# Patient Record
Sex: FEMALE | URBAN - METROPOLITAN AREA
[De-identification: names, ages, dates, MRNs, and addresses within clinical notes are randomized per-mention and may not be internally consistent; named-entity substitution may affect disease eponyms.]

---

## 2017-08-08 NOTE — PATIENT DISCUSSION
Continue: Systane Ultra (PF) (peg 400-propylene glycol (pf)): dropperette: 0.4-0.3% twice a day as needed

## 2017-08-08 NOTE — PATIENT DISCUSSION
PINGUECULA, OU:  PRESCRIBED ARTIFICIAL TEARS BID - QID, OU ADD LOTEMAX PRAMOD QHS OUT X 2 WEEKS.   RETURN TO FOLLOW UP.

## 2017-08-08 NOTE — PATIENT DISCUSSION
MODERATE DRY EYES: PRESCRIBED DISAPPEARING PRESERVATIVE OR PRESERVATIVE FREE ARTIFICIAL TEARS BID &ndash; QID4-6X A DAY, OU AND THE DAILY INTAKE OF OMEGA-3 DHA/EPA FATTY ACIDS TO HELP RELIEVE SYMPTOMS. CONTINUE RESTASIS BID OU.

## 2017-08-08 NOTE — PATIENT DISCUSSION
Posterior Capsular Fibrosis Counseling: The diagnosis of posterior capsular fibrosis (PCF), also referred to as a secondary cataract or posterior capsular opacification (PCO), was discussed with the patient. The patient understands that their symptoms and limitations are likely related to this condition. I have reviewed the risks, benefits and alternatives of  YAG laser surgery for the treatment of the fibrosis. The uncommon risk of an increase in intraocular pressure or a retinal detachment and their associated symptoms were explained to the patient.

## 2017-08-08 NOTE — PATIENT DISCUSSION
Pinguecula Counseling:  I have explained to the patient at length the diagnosis of pinguecula and its pathophysiology. I recommended the patient adequately protect their eyes from excessive UV light and dry, odilon conditions. The use of artificial tears in dry conditions was encouraged. Return for follow-up as scheduled.

## 2017-08-08 NOTE — PATIENT DISCUSSION
New Prescription: Lotemax (loteprednol etabonate): ointment: 0.5% a small amount at bedtime into both eyes 08-

## 2018-01-19 ENCOUNTER — IMPORTED ENCOUNTER (OUTPATIENT)
Dept: URBAN - METROPOLITAN AREA CLINIC 9 | Facility: CLINIC | Age: 28
End: 2018-01-19

## 2018-11-26 NOTE — PATIENT DISCUSSION
New Prescription: Restasis (cyclosporine): dropperette: 0.05% 1 drop twice a day as directed into both eyes 11-

## 2018-11-26 NOTE — PATIENT DISCUSSION
K SICCA OU: PRESCRIBED PRESERVATIVE FREE ARTIFICIAL TEARS 4-6X A DAY, OU AND THE DAILY INTAKE OF OMEGA-3 DHA/EPA FATTY ACIDS TO HELP RELIEVE SYMPTOMS. ADD NIGHTLY LUBRICATING OINTMENT OR GEL.  CONTINUE RESTASIS BID

## 2018-11-26 NOTE — PATIENT DISCUSSION
New Prescription: Lotemax (loteprednol etabonate): ointment: 0.5% a small amount at bedtime into both eyes 11-

## 2018-11-26 NOTE — PATIENT DISCUSSION
Stopped Today: Lotemax (loteprednol etabonate): ointment: 0.5% a small amount at bedtime as needed into both eyes

## 2019-02-12 NOTE — PATIENT DISCUSSION
The patient had multiple lesions on the right upper eyelid. After informed consent the lesions were anesthetized with local anesthetic, 1% lidocane with epinephrine 1:100,000 units. Sterile technique was used to remove the lesions with Nataly scissors. Antibiotic ointment was used to treat the area where the lesions were removed. Lesions were sent to pathology for analysis. The patient was given written post operative wound care instructions and a prescription for antibiotic ointment. The patient was asked to call  within 10 days if they had not been otherwise called by our office with the result of the biopsy.

## 2019-02-12 NOTE — PATIENT DISCUSSION
The patient had multiple lesions on the left lower eyelid. After informed consent the lesions were anesthetized with local anesthetic, 1% lidocane with epinephrine 1:100,000 units. Sterile technique was used to remove the lesions with Nataly scissors. Antibiotic ointment was used to treat the area where the lesions were removed. Lesions were sent to pathology for analysis. The patient was given written post operative wound care instructions and a prescription for antibiotic ointment. The patient was asked to call  within 10 days if they had not been otherwise called by our office with the result of the biopsy.

## 2019-02-12 NOTE — PATIENT DISCUSSION
EYELID LESIONS, RIGHT AND LEFT UPPER AND LOWER EYELID:  RECOMMEND EXCISION WITH SPECIMEN TO PATHOLOGY.

## 2019-02-12 NOTE — PATIENT DISCUSSION
The patient had multiple lesions on the left upper eyelid. After informed consent the lesions were anesthetized with local anesthetic, 1% lidocane with epinephrine 1:100,000 units. Sterile technique was used to remove the lesions with Nataly scissors. Antibiotic ointment was used to treat the area where the lesions were removed. Lesions were sent to pathology for analysis. The patient was given written post operative wound care instructions and a prescription for antibiotic ointment. The patient was asked to call  within 10 days if they had not been otherwise called by our office with the result of the biopsy.

## 2019-02-12 NOTE — PATIENT DISCUSSION
PHOTOGRAPHS: I have reviewed the external ocular photographs of this patient which show the following: multiple lesion right and left upper and lower eyelids.

## 2019-03-13 ENCOUNTER — IMPORTED ENCOUNTER (OUTPATIENT)
Dept: URBAN - METROPOLITAN AREA CLINIC 9 | Facility: CLINIC | Age: 29
End: 2019-03-13

## 2020-01-14 NOTE — PATIENT DISCUSSION
CONJUNCTIVITIS (ALLERGIC), OU__:  PRESCRIBE ___ZADITOR BID OU____. RETURN FOR FOLLOW-UP AS SCHEDULED.

## 2020-04-30 NOTE — PATIENT DISCUSSION
LESION  : I have discussed options with the patient, surgery versus follow. The risks, benefits, alternatives and alternatives include anesthesia, bleeding, infection, inflammation, swelling, bruising. The patient understands and desires to watch the lesion and call the office for re-evaluation if any changes in size or color occur.

## 2021-03-01 NOTE — PATIENT DISCUSSION
K SICCA OU: NO IMPROVEMENT WITH PRESERVATIVE FREE ARTIFICIAL TEARS 4-6X A DAY, OU  ADD NIGHTLY LUBRICATING OINTMENT OR GEL.  CONTINUE RESTASIS BID

## 2021-03-01 NOTE — PATIENT DISCUSSION
Dry Eye Syndrome Counseling: I have discussed the diagnosis and the pathophysiology of this disease with the patient. . Vision may be limited by dry eye, and symptoms exacerbated by environmental factors such as smoke, wind, or prolonged eye use. Treatment options include, but are not limited to, artificial tears, punctal plugs, topical and cyclosporine I stressed the importance of compliance with treatment.

## 2021-08-10 NOTE — PATIENT DISCUSSION
The patient had a lesion on the right lower eyelid. After informed consent the lesion was anesthetized with local anesthetic, 1% lidocaine with epinephrine 1:100,000 units. Sterile technique was used to remove the lesion with Nataly scissors. Antibiotic ointment was used to treat the area where lesion was removed. Lesion was sent to pathology for analysis. The patient was given written post operative wound care instructions and a prescription for antibiotic ointment. The patient was asked to call  within 10 days if they had not been otherwise called by our office with the result of the biopsy.

## 2021-08-10 NOTE — PATIENT DISCUSSION
The patient had a lesion on the left lower eyelid, central and lateral. After informed consent the lesion was anesthetized with local anesthetic, 1% lidocaine with epinephrine 1:100,000 units. Sterile technique was used to remove the lesion with Nataly scissors. Antibiotic ointment was used to treat the area where lesion was removed. Lesion was sent to pathology for analysis. The patient was given written post operative wound care instructions and a prescription for antibiotic ointment. The patient was asked to call  within 10 days if they had not been otherwise called by our office with the result of the biopsy.

## 2021-10-19 ASSESSMENT — TONOMETRY
OS_IOP_MMHG: 19
OS_IOP_MMHG: 16
OD_IOP_MMHG: 20
OD_IOP_MMHG: 14

## 2021-10-19 ASSESSMENT — VISUAL ACUITY
OD_CC: 14.0
OD_CC: 20/20 SN
OS_CC: 14.0
OS_CC: 20/20 SN
OD_CC: 20/20 SN
OS_CC: 20/20 SN

## 2022-06-22 RX ORDER — TRIAMCINOLONE ACETONIDE 0.25 MG/G
OINTMENT TOPICAL
COMMUNITY
Start: 2015-12-01

## 2022-06-22 RX ORDER — NORETHINDRONE ACETATE/ETHINYL ESTRADIOL AND FERROUS FUMARATE 1MG-20(24)
KIT ORAL
COMMUNITY
Start: 2017-04-05

## 2022-06-22 RX ORDER — NITROFURANTOIN 25; 75 MG/1; MG/1
CAPSULE ORAL
COMMUNITY

## 2022-06-22 RX ORDER — ETONOGESTREL AND ETHINYL ESTRADIOL 11.7; 2.7 MG/1; MG/1
INSERT, EXTENDED RELEASE VAGINAL
COMMUNITY
Start: 2017-08-03

## 2022-06-22 RX ORDER — DEXTROAMPHETAMINE SACCHARATE, AMPHETAMINE ASPARTATE, DEXTROAMPHETAMINE SULFATE AND AMPHETAMINE SULFATE 1.875; 1.875; 1.875; 1.875 MG/1; MG/1; MG/1; MG/1
TABLET ORAL
COMMUNITY

## 2022-06-22 RX ORDER — ONDANSETRON 4 MG/1
TABLET, FILM COATED ORAL
COMMUNITY
Start: 2020-11-18